# Patient Record
Sex: FEMALE | URBAN - METROPOLITAN AREA
[De-identification: names, ages, dates, MRNs, and addresses within clinical notes are randomized per-mention and may not be internally consistent; named-entity substitution may affect disease eponyms.]

---

## 2020-04-16 ENCOUNTER — APPOINTMENT (RX ONLY)
Dept: URBAN - METROPOLITAN AREA CLINIC 114 | Facility: CLINIC | Age: 20
Setting detail: DERMATOLOGY
End: 2020-04-16

## 2020-04-16 DIAGNOSIS — Z71.89 OTHER SPECIFIED COUNSELING: ICD-10-CM

## 2020-04-16 DIAGNOSIS — L64.8 OTHER ANDROGENIC ALOPECIA: ICD-10-CM

## 2020-04-16 DIAGNOSIS — L65.0 TELOGEN EFFLUVIUM: ICD-10-CM

## 2020-04-16 PROCEDURE — 99202 OFFICE O/P NEW SF 15 MIN: CPT | Mod: 95

## 2020-04-16 PROCEDURE — ? TREATMENT REGIMEN

## 2020-04-16 PROCEDURE — ? PRESCRIPTION

## 2020-04-16 PROCEDURE — ? COUNSELING

## 2020-04-16 PROCEDURE — ? ADDITIONAL NOTES

## 2020-04-16 ASSESSMENT — LOCATION DETAILED DESCRIPTION DERM
LOCATION DETAILED: RIGHT MEDIAL FRONTAL SCALP
LOCATION DETAILED: SUPERIOR MID FOREHEAD

## 2020-04-16 ASSESSMENT — LOCATION SIMPLE DESCRIPTION DERM
LOCATION SIMPLE: RIGHT SCALP
LOCATION SIMPLE: SUPERIOR FOREHEAD

## 2020-04-16 ASSESSMENT — LOCATION ZONE DERM
LOCATION ZONE: FACE
LOCATION ZONE: SCALP

## 2020-04-16 NOTE — PROCEDURE: ADDITIONAL NOTES
Additional Notes: ****Today's visit was conducted via TeleMedicine using a real-time, face-to-face video/audio encounter.  Prior to the telemedicine visit, the patient verified his/her identity and provided verbal consent for the telemedicine encounter.  This visit was conducted via telemedicine during the COVID-19 health crisis during a national state of emergency.  The patient is aware that his/her insurance will be billed for the encounter, and he/she may be responsible for any visit charges deemed to be \"[email or web address]\" by the insurance provider.****
Detail Level: Zone